# Patient Record
Sex: FEMALE | URBAN - NONMETROPOLITAN AREA
[De-identification: names, ages, dates, MRNs, and addresses within clinical notes are randomized per-mention and may not be internally consistent; named-entity substitution may affect disease eponyms.]

---

## 2022-06-13 ENCOUNTER — APPOINTMENT (RX ONLY)
Dept: URBAN - NONMETROPOLITAN AREA CLINIC 13 | Facility: CLINIC | Age: 42
Setting detail: DERMATOLOGY
End: 2022-06-13

## 2022-06-13 DIAGNOSIS — L56.4 POLYMORPHOUS LIGHT ERUPTION: ICD-10-CM | Status: STABLE

## 2022-06-13 DIAGNOSIS — L81.4 OTHER MELANIN HYPERPIGMENTATION: ICD-10-CM | Status: STABLE

## 2022-06-13 DIAGNOSIS — L82.1 OTHER SEBORRHEIC KERATOSIS: ICD-10-CM | Status: STABLE

## 2022-06-13 DIAGNOSIS — D22 MELANOCYTIC NEVI: ICD-10-CM | Status: STABLE

## 2022-06-13 DIAGNOSIS — L72.0 EPIDERMAL CYST: ICD-10-CM | Status: STABLE

## 2022-06-13 PROBLEM — D22.39 MELANOCYTIC NEVI OF OTHER PARTS OF FACE: Status: ACTIVE | Noted: 2022-06-13

## 2022-06-13 PROCEDURE — ? ADDITIONAL NOTES

## 2022-06-13 PROCEDURE — ? COUNSELING

## 2022-06-13 PROCEDURE — 99203 OFFICE O/P NEW LOW 30 MIN: CPT

## 2022-06-13 PROCEDURE — ? SUNSCREEN RECOMMENDATIONS

## 2022-06-13 ASSESSMENT — LOCATION DETAILED DESCRIPTION DERM
LOCATION DETAILED: RIGHT DISTAL DORSAL FOREARM
LOCATION DETAILED: LEFT PROXIMAL DORSAL FOREARM
LOCATION DETAILED: LEFT MEDIAL MALAR CHEEK
LOCATION DETAILED: LEFT DISTAL DORSAL FOREARM
LOCATION DETAILED: LEFT INFERIOR CENTRAL MALAR CHEEK
LOCATION DETAILED: RIGHT PROXIMAL DORSAL FOREARM
LOCATION DETAILED: INFERIOR MID FOREHEAD

## 2022-06-13 ASSESSMENT — SEVERITY ASSESSMENT: SEVERITY: MILD

## 2022-06-13 ASSESSMENT — LOCATION ZONE DERM
LOCATION ZONE: FACE
LOCATION ZONE: ARM

## 2022-06-13 ASSESSMENT — PAIN INTENSITY VAS: HOW INTENSE IS YOUR PAIN 0 BEING NO PAIN, 10 BEING THE MOST SEVERE PAIN POSSIBLE?: NO PAIN

## 2022-06-13 ASSESSMENT — LOCATION SIMPLE DESCRIPTION DERM
LOCATION SIMPLE: LEFT FOREARM
LOCATION SIMPLE: INFERIOR FOREHEAD
LOCATION SIMPLE: RIGHT FOREARM
LOCATION SIMPLE: LEFT CHEEK

## 2022-06-13 NOTE — HPI: RASH
What Type Of Note Output Would You Prefer (Optional)?: Standard Output
How Severe Is Your Rash?: mild
Is This A New Presentation, Or A Follow-Up?: Rash
Additional History: Pt states that rash occurs after sun exposure. Pt has used moisturizing cream with some improvement.

## 2022-06-13 NOTE — PROCEDURE: ADDITIONAL NOTES
Additional Notes: Recommend seeing Carly for extraction
Detail Level: Detailed
Render Risk Assessment In Note?: no

## 2022-06-13 NOTE — PROCEDURE: COUNSELING
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 Notify us if she flares and will biopsy to confirm.
Detail Level: Zone

## 2022-06-28 ENCOUNTER — APPOINTMENT (RX ONLY)
Dept: URBAN - NONMETROPOLITAN AREA CLINIC 13 | Facility: CLINIC | Age: 42
Setting detail: DERMATOLOGY
End: 2022-06-28

## 2022-06-28 DIAGNOSIS — Z41.9 ENCOUNTER FOR PROCEDURE FOR PURPOSES OTHER THAN REMEDYING HEALTH STATE, UNSPECIFIED: ICD-10-CM

## 2022-06-28 PROCEDURE — ? COSMETIC EXTRACTIONS

## 2022-06-28 ASSESSMENT — LOCATION SIMPLE DESCRIPTION DERM
LOCATION SIMPLE: RIGHT CHEEK
LOCATION SIMPLE: CHIN
LOCATION SIMPLE: LEFT EYEBROW
LOCATION SIMPLE: LEFT CHEEK
LOCATION SIMPLE: RIGHT FOREHEAD

## 2022-06-28 ASSESSMENT — LOCATION DETAILED DESCRIPTION DERM
LOCATION DETAILED: LEFT INFERIOR CENTRAL MALAR CHEEK
LOCATION DETAILED: LEFT LATERAL EYEBROW
LOCATION DETAILED: RIGHT MENTAL CREASE
LOCATION DETAILED: LEFT CENTRAL MALAR CHEEK
LOCATION DETAILED: RIGHT LATERAL MALAR CHEEK
LOCATION DETAILED: RIGHT INFERIOR MEDIAL MALAR CHEEK
LOCATION DETAILED: RIGHT FOREHEAD

## 2022-06-28 ASSESSMENT — LOCATION ZONE DERM: LOCATION ZONE: FACE

## 2022-06-28 NOTE — PROCEDURE: COSMETIC EXTRACTIONS
Detail Level: Simple
Render The Number Of Extractions: Yes
Consent: The patient's consent was obtained including but not limited to risks of crusting, scabbing, blistering, scarring, darker or lighter pigmentary change, recurrence, incomplete removal and infection.
Anesthesia Volume In Cc: 0
Post-Care Instructions: I reviewed with the patient in detail post-care instructions. Patient is to wear sunprotection, and avoid picking at any of the treated lesions. Pt may apply Vaseline to crusted or scabbing areas.

## 2023-03-24 ENCOUNTER — APPOINTMENT (RX ONLY)
Dept: URBAN - NONMETROPOLITAN AREA CLINIC 13 | Facility: CLINIC | Age: 43
Setting detail: DERMATOLOGY
End: 2023-03-24

## 2023-03-24 DIAGNOSIS — Z41.9 ENCOUNTER FOR PROCEDURE FOR PURPOSES OTHER THAN REMEDYING HEALTH STATE, UNSPECIFIED: ICD-10-CM | Status: STABLE

## 2023-03-24 PROCEDURE — ? COSMETIC CONSULTATION: HAIR REMOVAL

## 2023-03-24 PROCEDURE — ? ADDITIONAL NOTES

## 2023-03-24 ASSESSMENT — LOCATION SIMPLE DESCRIPTION DERM
LOCATION SIMPLE: GENITALIA
LOCATION SIMPLE: LEFT AXILLARY VAULT
LOCATION SIMPLE: LEFT PRETIBIAL REGION
LOCATION SIMPLE: RIGHT AXILLARY VAULT
LOCATION SIMPLE: RIGHT PRETIBIAL REGION

## 2023-03-24 ASSESSMENT — LOCATION DETAILED DESCRIPTION DERM
LOCATION DETAILED: LEFT AXILLARY VAULT
LOCATION DETAILED: RIGHT AXILLARY VAULT
LOCATION DETAILED: RIGHT PROXIMAL PRETIBIAL REGION
LOCATION DETAILED: GENITALIA
LOCATION DETAILED: LEFT PROXIMAL PRETIBIAL REGION

## 2023-03-24 ASSESSMENT — LOCATION ZONE DERM
LOCATION ZONE: LEG
LOCATION ZONE: TRUNK
LOCATION ZONE: AXILLAE

## 2023-03-24 NOTE — PROCEDURE: ADDITIONAL NOTES
[No falls in past year] : Patient reported no falls in the past year
[0] : 2) Feeling down, depressed, or hopeless: Not at all (0)
[[id number]] : 0
Render Risk Assessment In Note?: no
Additional Notes: Areas to be treated: Axillae, Legs, Bikini. Recommend 5-10 tx per area. May need to do legs at seperate visit from legs and bikini areas due to large surface area.
Detail Level: Detailed

## 2023-03-27 ENCOUNTER — APPOINTMENT (RX ONLY)
Dept: URBAN - NONMETROPOLITAN AREA CLINIC 13 | Facility: CLINIC | Age: 43
Setting detail: DERMATOLOGY
End: 2023-03-27

## 2023-03-27 DIAGNOSIS — Z41.9 ENCOUNTER FOR PROCEDURE FOR PURPOSES OTHER THAN REMEDYING HEALTH STATE, UNSPECIFIED: ICD-10-CM

## 2023-03-27 PROCEDURE — ? LASER HAIR REMOVAL

## 2023-03-27 NOTE — PROCEDURE: LASER HAIR REMOVAL
Eye Shield Text: Given the treatment area eye shields were inserted prior to treatment.
Pre-Procedure: Prior to proceeding the treatment areas were cleaned and all present put on their eye protection.
Number Of Prepaid Treatments (Will Not Render If 0): 0
Treatment Number: 1
Cooling: DCD 40/40
Fluence (Will Not Render If 0): 10
Shaving (Optional): The patient shaved at home
Render Post-Care In The Note: No
Fluence (Will Not Render If 0): 20
Post-Procedure Care: Immediate endpoint: perifollicular erythema and edema. Vaseline and ice applied. Post care reviewed with patient.
Fluence (Will Not Render If 0): 20
Fluence (Will Not Render If 0): 14
Spot Size: 18 mm
Consent: Written consent obtained, risks reviewed including but not limited to crusting, scabbing, blistering, scarring, darker or lighter pigmentary change, paradoxical hair regrowth, incomplete removal of hair and infection.
Post-Care Instructions: I reviewed with the patient in detail post-care instructions. Patient should avoid sun for a minimum of 4 weeks before and after treatment.
Laser Type: Alexandrite 755nm
Detail Level: Detailed

## 2023-04-24 ENCOUNTER — APPOINTMENT (RX ONLY)
Dept: URBAN - NONMETROPOLITAN AREA CLINIC 13 | Facility: CLINIC | Age: 43
Setting detail: DERMATOLOGY
End: 2023-04-24

## 2023-04-24 DIAGNOSIS — Z41.9 ENCOUNTER FOR PROCEDURE FOR PURPOSES OTHER THAN REMEDYING HEALTH STATE, UNSPECIFIED: ICD-10-CM

## 2023-04-24 PROCEDURE — ? LASER HAIR REMOVAL

## 2023-04-24 NOTE — PROCEDURE: LASER HAIR REMOVAL
Eye Shield Text: Given the treatment area eye shields were inserted prior to treatment.
Pre-Procedure: Prior to proceeding the treatment areas were cleaned and all present put on their eye protection.
Number Of Prepaid Treatments (Will Not Render If 0): 0
Treatment Number: 2
Cooling: DCD 40/40
Fluence (Will Not Render If 0): 10
Shaving (Optional): The patient shaved at home
Render Post-Care In The Note: No
Fluence (Will Not Render If 0): 20
Post-Procedure Care: Immediate endpoint: perifollicular erythema and edema. Vaseline and ice applied. Post care reviewed with patient.
Fluence (Will Not Render If 0): 20
Spot Size: 18 mm
Consent: Written consent obtained, risks reviewed including but not limited to crusting, scabbing, blistering, scarring, darker or lighter pigmentary change, paradoxical hair regrowth, incomplete removal of hair and infection.
Laser Type: Alexandrite 755nm
Post-Care Instructions: I reviewed with the patient in detail post-care instructions. Patient should avoid sun for a minimum of 4 weeks before and after treatment.
Treatment Number: 1
Detail Level: Detailed

## 2023-05-22 ENCOUNTER — APPOINTMENT (RX ONLY)
Dept: URBAN - NONMETROPOLITAN AREA CLINIC 13 | Facility: CLINIC | Age: 43
Setting detail: DERMATOLOGY
End: 2023-05-22

## 2023-05-22 DIAGNOSIS — Z41.9 ENCOUNTER FOR PROCEDURE FOR PURPOSES OTHER THAN REMEDYING HEALTH STATE, UNSPECIFIED: ICD-10-CM

## 2023-05-22 PROCEDURE — ? LASER HAIR REMOVAL

## 2023-05-22 NOTE — PROCEDURE: LASER HAIR REMOVAL
Eye Shield Text: Given the treatment area eye shields were inserted prior to treatment.
Pre-Procedure: Prior to proceeding the treatment areas were cleaned and all present put on their eye protection.
Number Of Prepaid Treatments (Will Not Render If 0): 0
Treatment Number: 3
Cooling: DCD 40/40
Fluence (Will Not Render If 0): 10
Shaving (Optional): The patient shaved at home
Render Post-Care In The Note: No
Fluence (Will Not Render If 0): 20
Post-Procedure Care: Immediate endpoint: perifollicular erythema and edema. Vaseline and ice applied. Post care reviewed with patient.
Fluence (Will Not Render If 0): 20
Spot Size: 18 mm
Consent: Written consent obtained, risks reviewed including but not limited to crusting, scabbing, blistering, scarring, darker or lighter pigmentary change, paradoxical hair regrowth, incomplete removal of hair and infection.
Laser Type: Alexandrite 755nm
Post-Care Instructions: I reviewed with the patient in detail post-care instructions. Patient should avoid sun for a minimum of 4 weeks before and after treatment.
Treatment Number: 2
Detail Level: Detailed

## 2023-08-23 ENCOUNTER — APPOINTMENT (RX ONLY)
Dept: URBAN - NONMETROPOLITAN AREA CLINIC 13 | Facility: CLINIC | Age: 43
Setting detail: DERMATOLOGY
End: 2023-08-23

## 2023-08-23 DIAGNOSIS — Z41.9 ENCOUNTER FOR PROCEDURE FOR PURPOSES OTHER THAN REMEDYING HEALTH STATE, UNSPECIFIED: ICD-10-CM

## 2023-08-23 PROCEDURE — ? LASER HAIR REMOVAL

## 2023-08-23 NOTE — PROCEDURE: LASER HAIR REMOVAL
Eye Shield Text: Given the treatment area eye shields were inserted prior to treatment.
Pre-Procedure: Prior to proceeding the treatment areas were cleaned and all present put on their eye protection.
Number Of Prepaid Treatments (Will Not Render If 0): 0
Treatment Number: 4
Cooling: DCD 40/40
Fluence (Will Not Render If 0): 12
Shaving (Optional): The patient shaved at home
Render Post-Care In The Note: No
Fluence (Will Not Render If 0): 20
Post-Procedure Care: Immediate endpoint: perifollicular erythema and edema. Vaseline and ice applied. Post care reviewed with patient.
Fluence (Will Not Render If 0): 20
Spot Size: 15 mm
Consent: Written consent obtained, risks reviewed including but not limited to crusting, scabbing, blistering, scarring, darker or lighter pigmentary change, paradoxical hair regrowth, incomplete removal of hair and infection.
Laser Type: Alexandrite 755nm
Post-Care Instructions: I reviewed with the patient in detail post-care instructions. Patient should avoid sun for a minimum of 4 weeks before and after treatment.
Treatment Number: 3
Detail Level: Detailed

## 2023-10-27 ENCOUNTER — APPOINTMENT (RX ONLY)
Dept: URBAN - NONMETROPOLITAN AREA CLINIC 13 | Facility: CLINIC | Age: 43
Setting detail: DERMATOLOGY
End: 2023-10-27

## 2023-10-27 DIAGNOSIS — Z41.9 ENCOUNTER FOR PROCEDURE FOR PURPOSES OTHER THAN REMEDYING HEALTH STATE, UNSPECIFIED: ICD-10-CM

## 2023-10-27 PROCEDURE — ? LASER HAIR REMOVAL

## 2023-10-27 NOTE — PROCEDURE: LASER HAIR REMOVAL
Eye Shield Text: Given the treatment area eye shields were inserted prior to treatment.
Pre-Procedure: Prior to proceeding the treatment areas were cleaned and all present put on their eye protection.
Number Of Prepaid Treatments (Will Not Render If 0): 0
Treatment Number: 4
Cooling: DCD 40/40
Fluence (Will Not Render If 0): 12
Shaving (Optional): The patient shaved at home
Render Post-Care In The Note: No
Fluence (Will Not Render If 0): 20
Post-Procedure Care: Immediate endpoint: perifollicular erythema and edema. Vaseline and ice applied. Post care reviewed with patient.
Fluence (Will Not Render If 0): 20
Spot Size: 15 mm
Consent: Written consent obtained, risks reviewed including but not limited to crusting, scabbing, blistering, scarring, darker or lighter pigmentary change, paradoxical hair regrowth, incomplete removal of hair and infection.
Laser Type: Alexandrite 755nm
Post-Care Instructions: I reviewed with the patient in detail post-care instructions. Patient should avoid sun for a minimum of 4 weeks before and after treatment.
Treatment Number: 5
Detail Level: Detailed

## 2023-12-01 ENCOUNTER — APPOINTMENT (RX ONLY)
Dept: URBAN - NONMETROPOLITAN AREA CLINIC 13 | Facility: CLINIC | Age: 43
Setting detail: DERMATOLOGY
End: 2023-12-01

## 2023-12-01 DIAGNOSIS — Z41.9 ENCOUNTER FOR PROCEDURE FOR PURPOSES OTHER THAN REMEDYING HEALTH STATE, UNSPECIFIED: ICD-10-CM

## 2023-12-01 PROCEDURE — ? LASER HAIR REMOVAL

## 2023-12-01 PROCEDURE — ? ADDITIONAL NOTES

## 2023-12-01 NOTE — PROCEDURE: LASER HAIR REMOVAL
Eye Shield Text: Given the treatment area eye shields were inserted prior to treatment.
Pre-Procedure: Prior to proceeding the treatment areas were cleaned and all present put on their eye protection.
Number Of Prepaid Treatments (Will Not Render If 0): 0
Treatment Number: 5
Cooling: DCD 40/40
Pulse Duration (Include Units): 20ms
Fluence (Will Not Render If 0): 18
Shaving (Optional): The patient shaved at home
Render Post-Care In The Note: No
Fluence (Will Not Render If 0): 20
Post-Procedure Care: Immediate endpoint: perifollicular erythema and edema. Vaseline and ice applied. Post care reviewed with patient.
Cooling Override: johnathan cryo level 5
Fluence (Will Not Render If 0): 20
Fluence (Will Not Render If 0): 30
Spot Size: 15 mm
Total Pulses: 432
Consent: Written consent obtained, risks reviewed including but not limited to crusting, scabbing, blistering, scarring, darker or lighter pigmentary change, paradoxical hair regrowth, incomplete removal of hair and infection.
Laser Type: Cynosure 1064 Yag
Pulse Duration (Include Units): 40ms
Post-Care Instructions: I reviewed with the patient in detail post-care instructions. Patient should avoid sun for a minimum of 4 weeks before and after treatment.
Spot Size: 10 mm
Laser Type: Alexandrite 755nm
Detail Level: Detailed

## 2023-12-01 NOTE — PROCEDURE: ADDITIONAL NOTES
Detail Level: Simple
Additional Notes: Spot treated darker areas of skin on the full bikini with YAG 1064 to ensure the skin does not burn in these areas.
Render Risk Assessment In Note?: no

## 2024-01-12 ENCOUNTER — APPOINTMENT (RX ONLY)
Dept: URBAN - NONMETROPOLITAN AREA CLINIC 13 | Facility: CLINIC | Age: 44
Setting detail: DERMATOLOGY
End: 2024-01-12

## 2024-01-12 DIAGNOSIS — Z41.9 ENCOUNTER FOR PROCEDURE FOR PURPOSES OTHER THAN REMEDYING HEALTH STATE, UNSPECIFIED: ICD-10-CM

## 2024-01-12 PROCEDURE — ? ADDITIONAL NOTES

## 2024-01-12 PROCEDURE — ? LASER HAIR REMOVAL

## 2024-01-12 NOTE — PROCEDURE: LASER HAIR REMOVAL
Eye Shield Text: Given the treatment area eye shields were inserted prior to treatment.
Pre-Procedure: Prior to proceeding the treatment areas were cleaned and all present put on their eye protection.
Number Of Prepaid Treatments (Will Not Render If 0): 0
Treatment Number: 6
Cooling: DCD 40/40
Pulse Duration (Include Units): 20ms
Fluence (Will Not Render If 0): 12
Shaving (Optional): The patient shaved at home
Render Post-Care In The Note: No
Fluence (Will Not Render If 0): 20
Post-Procedure Care: Immediate endpoint: perifollicular erythema and edema. Vaseline and ice applied. Post care reviewed with patient.
Cooling Override: johnathan cryo level 5
Fluence (Will Not Render If 0): 30
Spot Size: 15 mm
Total Pulses: 432
Consent: Written consent obtained, risks reviewed including but not limited to crusting, scabbing, blistering, scarring, darker or lighter pigmentary change, paradoxical hair regrowth, incomplete removal of hair and infection.
Laser Type: Cynosure 1064 Yag
Pulse Duration (Include Units): 40ms
Post-Care Instructions: I reviewed with the patient in detail post-care instructions. Patient should avoid sun for a minimum of 4 weeks before and after treatment.
Spot Size: 10 mm
Laser Type: Alexandrite 755nm
Detail Level: Detailed